# Patient Record
Sex: MALE | Race: WHITE | Employment: OTHER | URBAN - METROPOLITAN AREA
[De-identification: names, ages, dates, MRNs, and addresses within clinical notes are randomized per-mention and may not be internally consistent; named-entity substitution may affect disease eponyms.]

---

## 2017-01-01 ENCOUNTER — OFFICE VISIT (OUTPATIENT)
Dept: ORTHOPEDIC SURGERY | Age: 62
End: 2017-01-01

## 2017-01-01 VITALS — BODY MASS INDEX: 36.96 KG/M2 | HEIGHT: 66 IN | WEIGHT: 230 LBS | RESPIRATION RATE: 16 BRPM

## 2017-01-01 DIAGNOSIS — R52 PAIN: ICD-10-CM

## 2017-01-01 DIAGNOSIS — Z98.890 S/P ORIF (OPEN REDUCTION INTERNAL FIXATION) FRACTURE: ICD-10-CM

## 2017-01-01 DIAGNOSIS — S72.141A CLOSED INTERTROCHANTERIC FRACTURE OF HIP, RIGHT, INITIAL ENCOUNTER (HCC): Primary | ICD-10-CM

## 2017-01-01 DIAGNOSIS — Z87.81 S/P ORIF (OPEN REDUCTION INTERNAL FIXATION) FRACTURE: ICD-10-CM

## 2017-01-01 PROCEDURE — 99024 POSTOP FOLLOW-UP VISIT: CPT | Performed by: ORTHOPAEDIC SURGERY

## 2017-01-01 RX ORDER — QUETIAPINE 300 MG/1
300 TABLET, FILM COATED, EXTENDED RELEASE ORAL
COMMUNITY
Start: 2011-03-17 | End: 2017-01-01

## 2017-01-01 RX ORDER — ACETAMINOPHEN 500 MG
500 TABLET ORAL
COMMUNITY
Start: 2011-03-17

## 2017-01-01 RX ORDER — ASPIRIN AND DIPYRIDAMOLE 25; 200 MG/1; MG/1
1 CAPSULE, EXTENDED RELEASE ORAL
COMMUNITY
Start: 2011-03-17 | End: 2017-01-01

## 2017-10-31 PROBLEM — S72.141A CLOSED INTERTROCHANTERIC FRACTURE OF HIP, RIGHT, INITIAL ENCOUNTER (HCC): Status: ACTIVE | Noted: 2017-01-01

## 2017-10-31 PROBLEM — S72.001A CLOSED RIGHT HIP FRACTURE, INITIAL ENCOUNTER (HCC): Status: ACTIVE | Noted: 2017-01-01

## 2017-10-31 PROBLEM — S72.141A CLOSED INTERTROCHANTERIC FRACTURE OF HIP, RIGHT, INITIAL ENCOUNTER (HCC): Chronic | Status: ACTIVE | Noted: 2017-01-01

## 2017-11-13 NOTE — PATIENT INSTRUCTIONS
· Incision care:  · Leave your incision open to air. You may wash the incision daily with the Chlorhexidine soap that was provided before your surgery. · Do not soak the incision for 6 weeks after surgery (swimming pool, hot tub, etc...)  · Continue ice on surgical site for swelling and pain control. · Mechanical and pharmacologic DVT prophylaxis. · Prefer Aspirin 325mg PO BID for 21 days post-op. · Continue to wear your HAYLEY hose for 21 days post-op. · Pain management:  · Continue current regimen. · Prefer multimodal oral drug therapy. · Physical Therapy / Occupational Therapy goals:  · sitting upright -->  · gait training, ambulation with walker, out of bed to chair -->  · transfers, gait normalization -->  · [place]  · Continue PT/OT until goals are met  · Weight Bearing Status - Weight bearing as tolerated on right lower extremity.   · Follow up with me: in 4 week(s)

## 2017-11-13 NOTE — PROGRESS NOTES
Scribe Authentication Statement  Tra Shepherd, scribed portions of this documentation for and in the presence of Dr. Guerita Germain DO on 11/13/17 at 12:36 PM.    Provider Authentication Statement:  I, Walker Ojeda DO, personally performed the services described in this documentation and they were scribed in my presence by the above listed scribe. The documentation is both accurate and complete. ORTHOPEDIC FOLLOW UP AFTER SURGERY    HISTORY OF PRESENT ILLNESS (HPI):   Nicole Lazaro is a 60y.o. year old male who is here for follow up of:  1. Closed intertrochanteric fracture of hip, right, initial encounter ([facility] [address]    2. S/P ORIF (open reduction internal fixation) fracture    3. Pain        Procedure Name: Procedure:   1) Cephalomedullary nail fixation of Right proximal femur fracture (CPT: 20971)   Surgery Date: October/31/2017   Post-Op Number: 2 week(s)   How has the problem changed since the last visit: gradually improving   How have the associated manifestations changed since the last visit: Improved   New associated manifestations are: none   Adverse effects or complications: None   Other Comments:       Current Outpatient Prescriptions   Medication Sig Dispense Refill    acetaminophen (TYLENOL) 500 MG tablet Take 500 mg by mouth      dipyridamole-aspirin (AGGRENOX)  MG per extended release capsule Take 1 capsule by mouth      niacin-lovastatin (ADVICOR) 500-20 MG per extended release tablet Take 1 tablet by mouth      QUEtiapine (SEROQUEL XR) 300 MG extended release tablet Take 300 mg by mouth      traMADol (ULTRAM) 50 MG tablet Take 1 tablet by mouth every 6 hours as needed for Pain 24 tablet 0    aspirin 325 MG EC tablet Take 1 tablet by mouth 2 times daily 42 tablet 0    HYDROcodone-acetaminophen (NORCO) 5-325 MG per tablet Take 1 tablet by mouth every 8 hours .  morphine (MSIR) 15 MG tablet Take 15 mg by mouth every 8 hours .       carvedilol (COREG) 6.25 MG tablet Take 6.25 mg by mouth 2 times daily (with meals)      amLODIPine (NORVASC) 10 MG tablet Take 10 mg by mouth daily      benazepril (LOTENSIN) 40 MG tablet Take 40 mg by mouth daily      hydrochlorothiazide (MICROZIDE) 12.5 MG capsule Take 12.5 mg by mouth daily      lipase-protease-amylase (CREON) 88742 units delayed release capsule Take 12,000 Units by mouth 3 times daily (with meals)      ranitidine (ZANTAC) 150 MG capsule Take 150 mg by mouth 2 times daily      Potassium 99 MG TABS Take 1 tablet by mouth daily      vitamin B-12 (CYANOCOBALAMIN) 500 MCG tablet Take 500 mcg by mouth daily      gabapentin (NEURONTIN) 100 MG capsule Take 100 mg by mouth 3 times daily      lactobacillus (CULTURELLE) capsule Take 1 capsule by mouth daily (with breakfast). 30 capsule 0    dicyclomine (BENTYL) 10 MG capsule Take 1 capsule by mouth 3 times daily (before meals). (Patient taking differently: Take 20 mg by mouth 3 times daily (before meals) ) 90 capsule 0    pravastatin (PRAVACHOL) 40 MG tablet Take 40 mg by mouth daily.  omeprazole (PRILOSEC) 20 MG capsule Take 40 mg by mouth Daily       fish oil-omega-3 fatty acids 1000 MG capsule Take 1 g by mouth 2 times daily.  Cholecalciferol 400 UNIT TABS tablet Take 1,000 Units by mouth 2 times daily       aspirin 81 MG chewable tablet Take 81 mg by mouth 2 times daily        No current facility-administered medications for this visit. ORTHOPEDIC EXAM:     [] Left Upper Extremity [] Right Upper Extremity  [] Left Lower Extremity [x] Right Lower Extremity    Inspection:  Incision: [x]Healing well without significant drainage, dehiscence, or significant erythema.   []Erythema greater than expected:  []Dehiscence:  []Drainage:   Circulation: [x] Warm, well perfused, good capillary refill  [] Cool  [] Sluggish cap refill  [] Other:   Skin: [x] Intact without discoloration  [] Pale  [] Erythematous  [] Ecchymotic   [] Maceration  [] Other:  [] Wound:   Cast/Splint: [x] None  [] Intact, dry, and functional without any unexpected wear  [] Poorly fitting  [] Wet  [] Foul smelling  [] Broken  [] Patient altered  [] Foreign body within  [] Soft  [] Other:     Palpation:  Tenderness: [] None  [x] No more than expected  [] Pertinent positives & negatives:   Swelling: [] None  [x] No more than expected  [] Pertinent positives & negatives:   Calor (Heat) [x] None  [] No more than expected  [] Location:   Other Findings: -     Range of Motion:  [] ROM deferred due to recent surgery   Active ROM: [] Full functional  [] Measured =   [] Mildly limited  [x] Moderately limited  [] Severely limited  Patient is post-polio with muscle weakness. Passive ROM: [] Full functional  [] Measured =  [x] Mildly limited  [] Moderately limited  [] Severely limited     Motor Function:  [x]Intact. No focal motor deficits. []Impaired:       Sensation:  Sensation to light touch: [x] Intact  [] Diminished:  [] Absent:       MEDICAL DATA:   Imaging:  Hip x-ray:  2 view(s) of the right hip were obtained and reviewed and show an intertrochanteric hip fracture that has been surgically stabilized with a cephalomedullary device. The fracture is healing in acceptable alignment. No signs of complication. No associated fractures, dislocations, or subluxations. Pelvis x-ray:  AP view of the pelvis is obtained and reviewed and show a right intertrochanteric hip fracture that has been surgically stabilized with a cephalomedullary device. The fracture is healing in acceptable alignment. No signs of complication. No associated fractures, dislocations, or subluxations. Leg lengths are equal as measured at the level of the lesser trochanter. ASSESSMENT / PLAN:     1. Closed intertrochanteric fracture of hip, right, initial encounter ([facility] [address]     2. S/P ORIF (open reduction internal fixation) fracture     3.  Pain       Follow up for Total Hip Arthroplasty  · Continue current medical management and follow up with your regular doctor within the 7 to 10 days after discharge from the hospital for evaluation and management of postoperative medical needs. · Incision care:  · Leave your incision open to air. You may wash the incision daily with the Chlorhexidine soap that was provided before your surgery. · Do not soak the incision for 6 weeks after surgery (swimming pool, hot tub, etc...)  · Continue ice on surgical site for swelling and pain control. · Mechanical and pharmacologic DVT prophylaxis. · Prefer Aspirin 325mg PO BID for 21 days post-op. · Continue to wear your HAYLEY hose for 21 days post-op. · Pain management:  · Continue current regimen. · Prefer multimodal oral drug therapy. · Physical Therapy / Occupational Therapy goals:  · sitting upright -->  · gait training, ambulation with walker, out of bed to chair -->  · transfers, gait normalization -->  · [place]  · Continue PT/OT until goals are met  · Weight Bearing Status - Weight bearing as tolerated on right lower extremity.   · Follow up with me: in 4 week(s)          Electronically signed by Magdalena Neville on 11/13/2017 at 12:36 PM

## 2017-12-06 PROBLEM — J96.00 ACUTE RESPIRATORY FAILURE (HCC): Status: ACTIVE | Noted: 2017-01-01
